# Patient Record
Sex: MALE | Race: WHITE
[De-identification: names, ages, dates, MRNs, and addresses within clinical notes are randomized per-mention and may not be internally consistent; named-entity substitution may affect disease eponyms.]

---

## 2021-01-17 ENCOUNTER — HOSPITAL ENCOUNTER (EMERGENCY)
Dept: HOSPITAL 26 - MED | Age: 41
LOS: 1 days | Discharge: PSYCHIATRIC OR SUBSTANCE USE FACILITY | End: 2021-01-18
Payer: COMMERCIAL

## 2021-01-17 VITALS — BODY MASS INDEX: 27.49 KG/M2 | WEIGHT: 165 LBS | HEIGHT: 65 IN

## 2021-01-17 VITALS — DIASTOLIC BLOOD PRESSURE: 87 MMHG | SYSTOLIC BLOOD PRESSURE: 136 MMHG

## 2021-01-17 DIAGNOSIS — Z20.828: ICD-10-CM

## 2021-01-17 DIAGNOSIS — J45.909: ICD-10-CM

## 2021-01-17 DIAGNOSIS — F79: Primary | ICD-10-CM

## 2021-01-17 DIAGNOSIS — F31.9: ICD-10-CM

## 2021-01-17 LAB
ALBUMIN FLD-MCNC: 3.4 G/DL (ref 3.4–5)
ANION GAP SERPL CALCULATED.3IONS-SCNC: 9.1 MMOL/L (ref 8–16)
APAP SERPL-MCNC: < 0.5 UG/ML (ref 10–30)
APPEARANCE UR: CLEAR
AST SERPL-CCNC: 22 U/L (ref 15–37)
BARBITURATES UR QL SCN: NEGATIVE NG/ML
BASOPHILS # BLD AUTO: 0 K/UL (ref 0–0.22)
BASOPHILS NFR BLD AUTO: 0.5 % (ref 0–2)
BENZODIAZ UR QL SCN: NEGATIVE NG/ML
BILIRUB SERPL-MCNC: 0.2 MG/DL (ref 0–1)
BILIRUB UR QL STRIP: NEGATIVE
BUN SERPL-MCNC: 25 MG/DL (ref 7–18)
BZE UR QL SCN: NEGATIVE NG/ML
CANNABINOIDS UR QL SCN: NEGATIVE NG/ML
CHLORIDE SERPL-SCNC: 107 MMOL/L (ref 98–107)
CO2 SERPL-SCNC: 27.7 MMOL/L (ref 21–32)
COLOR UR: YELLOW
CREAT SERPL-MCNC: 1.5 MG/DL (ref 0.6–1.3)
EOSINOPHIL # BLD AUTO: 0.2 K/UL (ref 0–0.4)
EOSINOPHIL NFR BLD AUTO: 1.8 % (ref 0–4)
ERYTHROCYTE [DISTWIDTH] IN BLOOD BY AUTOMATED COUNT: 13.8 % (ref 11.6–13.7)
GFR SERPL CREATININE-BSD FRML MDRD: 66 ML/MIN (ref 90–?)
GLUCOSE SERPL-MCNC: 109 MG/DL (ref 74–106)
GLUCOSE UR STRIP-MCNC: NEGATIVE MG/DL
HCT VFR BLD AUTO: 38.4 % (ref 36–52)
HGB BLD-MCNC: 12.8 G/DL (ref 12–18)
HGB UR QL STRIP: (no result)
LEUKOCYTE ESTERASE UR QL STRIP: NEGATIVE
LYMPHOCYTES # BLD AUTO: 2.9 K/UL (ref 2–11.5)
LYMPHOCYTES NFR BLD AUTO: 30 % (ref 20.5–51.1)
MCH RBC QN AUTO: 27 PG (ref 27–31)
MCHC RBC AUTO-ENTMCNC: 33 G/DL (ref 33–37)
MCV RBC AUTO: 80.8 FL (ref 80–94)
MONOCYTES # BLD AUTO: 1.1 K/UL (ref 0.8–1)
MONOCYTES NFR BLD AUTO: 11.1 % (ref 1.7–9.3)
NEUTROPHILS # BLD AUTO: 5.4 K/UL (ref 1.8–7.7)
NEUTROPHILS NFR BLD AUTO: 56.6 % (ref 42.2–75.2)
NITRITE UR QL STRIP: NEGATIVE
OPIATES UR QL SCN: NEGATIVE NG/ML
PCP UR QL SCN: NEGATIVE NG/ML
PH UR STRIP: 6 [PH] (ref 5–9)
PLATELET # BLD AUTO: 210 K/UL (ref 140–450)
POTASSIUM SERPL-SCNC: 3.8 MMOL/L (ref 3.5–5.1)
RBC # BLD AUTO: 4.75 MIL/UL (ref 4.2–6.1)
RBC #/AREA URNS HPF: (no result) /HPF (ref 0–5)
SALICYLATES SERPL-MCNC: < 2.8 MG/DL (ref 2.8–20)
SODIUM SERPL-SCNC: 140 MMOL/L (ref 136–145)
WBC # BLD AUTO: 9.5 K/UL (ref 4.8–10.8)
WBC,URINE: 0 /HPF (ref 0–5)

## 2021-01-17 PROCEDURE — G0480 DRUG TEST DEF 1-7 CLASSES: HCPCS

## 2021-01-17 PROCEDURE — 99285 EMERGENCY DEPT VISIT HI MDM: CPT

## 2021-01-17 PROCEDURE — 87426 SARSCOV CORONAVIRUS AG IA: CPT

## 2021-01-17 PROCEDURE — 80305 DRUG TEST PRSMV DIR OPT OBS: CPT

## 2021-01-17 PROCEDURE — 81001 URINALYSIS AUTO W/SCOPE: CPT

## 2021-01-17 PROCEDURE — 80053 COMPREHEN METABOLIC PANEL: CPT

## 2021-01-17 PROCEDURE — G0482 DRUG TEST DEF 15-21 CLASSES: HCPCS

## 2021-01-17 PROCEDURE — U0003 INFECTIOUS AGENT DETECTION BY NUCLEIC ACID (DNA OR RNA); SEVERE ACUTE RESPIRATORY SYNDROME CORONAVIRUS 2 (SARS-COV-2) (CORONAVIRUS DISEASE [COVID-19]), AMPLIFIED PROBE TECHNIQUE, MAKING USE OF HIGH THROUGHPUT TECHNOLOGIES AS DESCRIBED BY CMS-2020-01-R: HCPCS

## 2021-01-17 PROCEDURE — 36415 COLL VENOUS BLD VENIPUNCTURE: CPT

## 2021-01-17 PROCEDURE — 85025 COMPLETE CBC W/AUTO DIFF WBC: CPT
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
## 2021-01-17 NOTE — NUR
PT LAYING IN BED WITH EYES CLOSED. VISIBLE CHEST RISE AND FALL NOTED. 1:1 
MONITORING IN PLACE. ALL NEEDS MET AT THIS TIME. WILL CONTINUE TO MONITOR.

## 2021-01-18 VITALS — DIASTOLIC BLOOD PRESSURE: 93 MMHG | SYSTOLIC BLOOD PRESSURE: 138 MMHG

## 2021-01-18 NOTE — NUR
REPORTED PROVIDED TO RAD[facility] ALATORRE FOR TRANSFER OF CARE.

-------------------------------------------------------------------------------

Addendum: 01/18/21 at 0714 by MEDOE

-------------------------------------------------------------------------------

REPORTED PROVIDED TO MOISE ALATORRE FOR TRANSFER OF CARE.

## 2021-01-18 NOTE — NUR
Received report.  Per Landon, packet was faxed to [facility], [facility], [place].



[facility] aware patient is pending PCR Covid results.

## 2021-01-18 NOTE — NUR
Patient accepted to [facility], Room 15-A, under Dr. Peck.  For report please call [phone].

## 2021-01-18 NOTE — NUR
-------------------------------------------------------------------------------

            *** Note undone in [facility] - 01/18/21 at 0413 by PATSY ***             

-------------------------------------------------------------------------------

WOUND CARE PROVIDED WITH PICTURES AND DOCUMENTATION.

## 2021-01-18 NOTE — NUR
Pt resting in gurney, bed is locked and in lowest position, side rails x 2 for 
pt safety. Pt acting calm and quietly talking to himself. No acute distress 
noted.

## 2021-01-18 NOTE — NUR
Patient to be transferred to [facility].  Is being 
transferred due to higher level of care.  Receiving facility has accepting 
physician and available space. ER physician has signed transfer form.  Patient 
or responsible party has agreed to transfer and signed form.  Patient 
belongings inventoried and will be sent with patient.  Copy of nursing notes, 
lab reports, EKG, Physicians Orders and X-rays to be sent with patient.  Report 
called to CELI Davis at receiving facility.  [facility] ambulance service has been 
called for transfer.

## 2021-01-18 NOTE — NUR
PATIENT FOUND SLEEPING IN SEMI-LOCKHART'S POSITION ON BED. PATIENT BREATHING IS 
UNLABORED. PATIENT HAS EQUAL RISE AND FALL UPON RESPIRATION. BED LOCKED IN 
LOWEST POSITION. WILL CONTINUE TO MONITOR.